# Patient Record
Sex: FEMALE | URBAN - METROPOLITAN AREA
[De-identification: names, ages, dates, MRNs, and addresses within clinical notes are randomized per-mention and may not be internally consistent; named-entity substitution may affect disease eponyms.]

---

## 2017-02-26 ENCOUNTER — HOSPITAL ENCOUNTER (OUTPATIENT)
Facility: HOSPITAL | Age: 22
Setting detail: OBSERVATION
Discharge: HOME | End: 2017-02-26
Attending: OBSTETRICS & GYNECOLOGY | Admitting: OBSTETRICS & GYNECOLOGY

## 2017-02-26 VITALS
DIASTOLIC BLOOD PRESSURE: 66 MMHG | HEART RATE: 81 BPM | RESPIRATION RATE: 16 BRPM | TEMPERATURE: 98 F | SYSTOLIC BLOOD PRESSURE: 111 MMHG

## 2017-02-26 PROBLEM — Z34.90 PREGNANCY: Status: ACTIVE | Noted: 2017-02-26

## 2017-02-26 PROCEDURE — 59025 FETAL NON-STRESS TEST: CPT | Performed by: OBSTETRICS & GYNECOLOGY

## 2017-02-26 RX ORDER — SODIUM CHLORIDE, SODIUM LACTATE, POTASSIUM CHLORIDE, CALCIUM CHLORIDE 600; 310; 30; 20 MG/100ML; MG/100ML; MG/100ML; MG/100ML
INJECTION, SOLUTION INTRAVENOUS CONTINUOUS
Status: DISCONTINUED | OUTPATIENT
Start: 2017-02-26 | End: 2017-02-26

## 2017-02-26 RX ORDER — TERBUTALINE SULFATE 1 MG/ML
0.25 INJECTION, SOLUTION SUBCUTANEOUS
Status: ACTIVE | OUTPATIENT
Start: 2017-02-26 | End: 2017-02-26

## 2017-02-26 NOTE — TRIAGE
[facility] [place]    Carmen Wade Patient Status:  Observation    1995 MRN [id number]   Location [facility] Attending Candie Orlando MD   Hosp Day # 0 PCP No primary care provider on file.        Alivia Cortez terbutaline. SVE 3 cm/60%/-3 station. BOW intact. Unchanged from SVE @ 1145. Pt. Given labor discharge instructions, as well as kick counts, pt. Verbalized understanding. Pt. Left hospital ambulatory and in stable condition.    Order received from

## 2017-02-27 PROBLEM — O47.00 THREATENED PRETERM LABOR: Status: ACTIVE | Noted: 2017-02-27

## 2017-02-27 NOTE — H&P
[facility] Patient Status:  Observation    1995 MRN [id number]   Location [address] C-D Attending No att. providers found   Hosp Day # 0 PCP No primary care provider on file.      Date given IVF bolus which did not help with improving pain or regularity of contractions. Cervix 2-3cm per nurse and then 3cm per nurse. At that time patient given Terb which did help her contractions. She was overall very comfortable.  Cervix checked again 2 h

## 2017-03-20 ENCOUNTER — TELEPHONE (OUTPATIENT)
Dept: OBGYN CLINIC | Facility: CLINIC | Age: 22
End: 2017-03-20

## 2017-03-20 NOTE — TELEPHONE ENCOUNTER
Records from [facility] in [facility] received and placed on [employer] for review.

## (undated) NOTE — IP AVS SNAPSHOT
[address]  [address] [facility] [phone]                Discharge Summary   2/26/2017    Burnetta Masker           Admission Information        Provider Department    2/26/2017 Asher Dubois MD [facility] 3e C-D Immunization History as of 2/26/2017  Never Reviewed    No immunizations on file.       Radiology Exams     None      Patient Education    None         Additional Information       We are concerned for your overall well being:    - If you are a smoker or